# Patient Record
Sex: OTHER/UNKNOWN | URBAN - METROPOLITAN AREA
[De-identification: names, ages, dates, MRNs, and addresses within clinical notes are randomized per-mention and may not be internally consistent; named-entity substitution may affect disease eponyms.]

---

## 2024-03-14 ENCOUNTER — APPOINTMENT (OUTPATIENT)
Dept: URBAN - METROPOLITAN AREA CLINIC 256 | Age: 24
Setting detail: DERMATOLOGY
End: 2024-03-15

## 2024-03-14 VITALS — WEIGHT: 280 LBS | HEIGHT: 78 IN

## 2024-03-14 VITALS — HEIGHT: 78 IN | WEIGHT: 280 LBS

## 2024-03-14 DIAGNOSIS — L259 CONTACT DERMATITIS AND OTHER ECZEMA, UNSPECIFIED CAUSE: ICD-10-CM

## 2024-03-14 PROBLEM — L23.9 ALLERGIC CONTACT DERMATITIS, UNSPECIFIED CAUSE: Status: ACTIVE | Noted: 2024-03-14

## 2024-03-14 PROCEDURE — OTHER ORDER TESTS: OTHER

## 2024-03-14 PROCEDURE — OTHER PATIENT SPECIFIC COUNSELING: OTHER

## 2024-03-14 PROCEDURE — 99204 OFFICE O/P NEW MOD 45 MIN: CPT

## 2024-03-14 PROCEDURE — OTHER COUNSELING: OTHER

## 2024-03-14 PROCEDURE — OTHER PRESCRIPTION MEDICATION MANAGEMENT: OTHER

## 2024-03-14 PROCEDURE — OTHER PRESCRIPTION: OTHER

## 2024-03-14 PROCEDURE — OTHER PHOTO-DOCUMENTATION: OTHER

## 2024-03-14 PROCEDURE — OTHER MIPS QUALITY: OTHER

## 2024-03-14 RX ORDER — FLUOCINONIDE 0.5 MG/G
OINTMENT TOPICAL BID
Qty: 60 | Refills: 1 | Status: ERX | COMMUNITY
Start: 2024-03-14

## 2024-03-14 NOTE — PROCEDURE: ORDER TESTS
Expected Date Of Service: 03/14/2024
Lab Facility: [id number]
Billing Type: Third-Party Bill
[facility] Laboratory: [id number]
Bill For Surgical Tray: no

## 2024-03-14 NOTE — PROCEDURE: PATIENT SPECIFIC COUNSELING
Detail Level: Zone
Discussed that the patient does not have athlete's foot and that this is more of an eczema rash. Assured the patient did not likely contract the rash from the gym/shower. Discussed this can be commonly seen in people with sweaty feet. \\n\\nDiscussed the patient he may be sensitive/allergic to the dyes in his black shoes and/or socks. Advised patient to switch to wearing white canvas shoes and white socks immediately until clear. Discussed the patient's black shower sandals and advised these could also be responsible for the irritation; advised patient to stop wearing those at this time as well. \\n\\nCulture was performed to ensure no infection is present which could be exacerbating the rash. An antibiotic may be necessary pending the culture results. The patient expressed his understanding.

## 2024-03-14 NOTE — HPI: RASH
What Type Of Note Output Would You Prefer (Optional)?: Standard Output
Is This A New Presentation, Or A Follow-Up?: Follow Up Rash
Additional History: \\nBoth feet, worse on right, worse in past 6-8 weeks\\nOTC antifungal cream \\nTerbinifine \\n

## 2024-03-14 NOTE — PROCEDURE: PRESCRIPTION MEDICATION MANAGEMENT
Initiate Treatment: Fluocinonide 0.05% topical ointment BID
Render In Strict Bullet Format?: No
Plan: The patient will begin applying the fluocinonide 0.05% topical ointment at this time. Pending culture results, the patient's rash may require different/additional treatment. Patient advised to RTC in 3 weeks for further evaluation.
Detail Level: Zone
Discontinue Regimen: Antifungal creams / Terbinafine

## 2024-03-20 ENCOUNTER — RX ONLY (RX ONLY)
Age: 24
End: 2024-03-20

## 2024-03-20 RX ORDER — SULFAMETHOXAZOLE AND TRIMETHOPRIM 800; 160 MG/1; MG/1
TABLET ORAL
Qty: 20 | Refills: 0 | Status: ERX | COMMUNITY
Start: 2024-03-20

## 2024-04-04 ENCOUNTER — APPOINTMENT (OUTPATIENT)
Dept: URBAN - METROPOLITAN AREA CLINIC 256 | Age: 24
Setting detail: DERMATOLOGY
End: 2024-04-04

## 2024-04-04 DIAGNOSIS — L259 CONTACT DERMATITIS AND OTHER ECZEMA, UNSPECIFIED CAUSE: ICD-10-CM

## 2024-04-04 DIAGNOSIS — B95.61 METHICILLIN SUSCEPTIBLE STAPHYLOCOCCUS AUREUS INFECTION AS THE CAUSE OF DISEASES CLASSIFIED ELSEWHERE: ICD-10-CM

## 2024-04-04 PROBLEM — L23.9 ALLERGIC CONTACT DERMATITIS, UNSPECIFIED CAUSE: Status: ACTIVE | Noted: 2024-04-04

## 2024-04-04 PROCEDURE — OTHER PHOTO-DOCUMENTATION: OTHER

## 2024-04-04 PROCEDURE — 99214 OFFICE O/P EST MOD 30 MIN: CPT

## 2024-04-04 PROCEDURE — OTHER PRESCRIPTION: OTHER

## 2024-04-04 PROCEDURE — OTHER MIPS QUALITY: OTHER

## 2024-04-04 PROCEDURE — OTHER COUNSELING: OTHER

## 2024-04-04 PROCEDURE — OTHER PRESCRIPTION MEDICATION MANAGEMENT: OTHER

## 2024-04-04 PROCEDURE — OTHER PATIENT SPECIFIC COUNSELING: OTHER

## 2024-04-04 RX ORDER — FLUOCINONIDE 0.5 MG/G
OINTMENT TOPICAL BID
Qty: 60 | Refills: 1 | Status: ERX

## 2024-04-04 ASSESSMENT — LOCATION SIMPLE DESCRIPTION DERM
LOCATION SIMPLE: LEFT FOOT
LOCATION SIMPLE: RIGHT FOOT

## 2024-04-04 ASSESSMENT — LOCATION DETAILED DESCRIPTION DERM
LOCATION DETAILED: LEFT DORSAL FOOT
LOCATION DETAILED: RIGHT DORSAL FOOT

## 2024-04-04 ASSESSMENT — LOCATION ZONE DERM: LOCATION ZONE: FEET

## 2024-04-04 NOTE — PROCEDURE: PATIENT SPECIFIC COUNSELING
Detail Level: Zone
Advised patient that the staph infection was a secondary reaction to his eczema. Reviewed the patients changes to shoes and socks since his last visit. Advised that the infection/eczema should not recur if he continues to avoid trigger factors in his footwear. Assured that the residual redness is post inflammatory hyperpigmentation and will resolve over time. Patient expressed understanding.

## 2024-04-04 NOTE — PROCEDURE: PRESCRIPTION MEDICATION MANAGEMENT
Continue Regimen: Fluocinonide 0.05% topical ointment BID
Render In Strict Bullet Format?: No
Plan: The patient will continue to apply fluocinonide 0.05% topical ointment to affected area on the lateral right foot. Advised patient not to apply to the dorsum of the feet.
Detail Level: Zone